# Patient Record
Sex: MALE | Employment: OTHER | URBAN - METROPOLITAN AREA
[De-identification: names, ages, dates, MRNs, and addresses within clinical notes are randomized per-mention and may not be internally consistent; named-entity substitution may affect disease eponyms.]

---

## 2018-08-20 ENCOUNTER — HOSPITAL ENCOUNTER (INPATIENT)
Facility: MEDICAL CENTER | Age: 83
LOS: 2 days | DRG: 244 | End: 2018-08-22
Admitting: INTERNAL MEDICINE
Payer: MEDICARE

## 2018-08-20 ENCOUNTER — HOSPITAL ENCOUNTER (OUTPATIENT)
Facility: MEDICAL CENTER | Age: 83
DRG: 244 | End: 2018-08-20
Payer: MEDICARE

## 2018-08-20 LAB
EKG IMPRESSION: NORMAL
TROPONIN I SERPL-MCNC: 0.04 NG/ML (ref 0–0.04)

## 2018-08-20 PROCEDURE — 84484 ASSAY OF TROPONIN QUANT: CPT

## 2018-08-20 PROCEDURE — 99221 1ST HOSP IP/OBS SF/LOW 40: CPT | Performed by: INTERNAL MEDICINE

## 2018-08-20 PROCEDURE — 93010 ELECTROCARDIOGRAM REPORT: CPT | Mod: 77 | Performed by: INTERNAL MEDICINE

## 2018-08-20 PROCEDURE — 700105 HCHG RX REV CODE 258: Performed by: INTERNAL MEDICINE

## 2018-08-20 PROCEDURE — 36415 COLL VENOUS BLD VENIPUNCTURE: CPT

## 2018-08-20 PROCEDURE — 770020 HCHG ROOM/CARE - TELE (206)

## 2018-08-20 PROCEDURE — 93005 ELECTROCARDIOGRAM TRACING: CPT | Performed by: INTERNAL MEDICINE

## 2018-08-20 PROCEDURE — 93010 ELECTROCARDIOGRAM REPORT: CPT | Performed by: INTERNAL MEDICINE

## 2018-08-20 RX ORDER — POLYETHYLENE GLYCOL 3350 17 G/17G
1 POWDER, FOR SOLUTION ORAL
Status: DISCONTINUED | OUTPATIENT
Start: 2018-08-20 | End: 2018-08-22 | Stop reason: HOSPADM

## 2018-08-20 RX ORDER — ATORVASTATIN CALCIUM 10 MG/1
10 TABLET, FILM COATED ORAL NIGHTLY
COMMUNITY

## 2018-08-20 RX ORDER — SODIUM CHLORIDE 9 MG/ML
INJECTION, SOLUTION INTRAVENOUS CONTINUOUS
Status: CANCELLED | OUTPATIENT
Start: 2018-08-20

## 2018-08-20 RX ORDER — AMOXICILLIN 250 MG
2 CAPSULE ORAL 2 TIMES DAILY
Status: DISCONTINUED | OUTPATIENT
Start: 2018-08-20 | End: 2018-08-22 | Stop reason: HOSPADM

## 2018-08-20 RX ORDER — ACETAMINOPHEN 325 MG/1
650 TABLET ORAL EVERY 6 HOURS PRN
Status: DISCONTINUED | OUTPATIENT
Start: 2018-08-20 | End: 2018-08-22 | Stop reason: HOSPADM

## 2018-08-20 RX ORDER — BISACODYL 10 MG
10 SUPPOSITORY, RECTAL RECTAL
Status: DISCONTINUED | OUTPATIENT
Start: 2018-08-20 | End: 2018-08-22 | Stop reason: HOSPADM

## 2018-08-20 RX ORDER — SODIUM CHLORIDE 9 MG/ML
INJECTION, SOLUTION INTRAVENOUS CONTINUOUS
Status: DISCONTINUED | OUTPATIENT
Start: 2018-08-20 | End: 2018-08-21

## 2018-08-20 RX ADMIN — SODIUM CHLORIDE: 9 INJECTION, SOLUTION INTRAVENOUS at 18:51

## 2018-08-20 ASSESSMENT — LIFESTYLE VARIABLES
HAVE YOU EVER FELT YOU SHOULD CUT DOWN ON YOUR DRINKING: NO
TOTAL SCORE: 0
EVER HAD A DRINK FIRST THING IN THE MORNING TO STEADY YOUR NERVES TO GET RID OF A HANGOVER: NO
HOW MANY TIMES IN THE PAST YEAR HAVE YOU HAD 5 OR MORE DRINKS IN A DAY: 0
EVER HAD A DRINK FIRST THING IN THE MORNING TO STEADY YOUR NERVES TO GET RID OF A HANGOVER: NO
ON A TYPICAL DAY WHEN YOU DRINK ALCOHOL HOW MANY DRINKS DO YOU HAVE: 1
ALCOHOL_USE: YES
TOTAL SCORE: 0
TOTAL SCORE: 0
HAVE PEOPLE ANNOYED YOU BY CRITICIZING YOUR DRINKING: NO
TOTAL SCORE: 0
AVERAGE NUMBER OF DAYS PER WEEK YOU HAVE A DRINK CONTAINING ALCOHOL: 2
HAVE YOU EVER FELT YOU SHOULD CUT DOWN ON YOUR DRINKING: NO
ALCOHOL_USE: YES
CONSUMPTION TOTAL: NEGATIVE
HAVE PEOPLE ANNOYED YOU BY CRITICIZING YOUR DRINKING: NO
EVER FELT BAD OR GUILTY ABOUT YOUR DRINKING: NO
CONSUMPTION TOTAL: INCOMPLETE
TOTAL SCORE: 0
EVER FELT BAD OR GUILTY ABOUT YOUR DRINKING: NO
TOTAL SCORE: 0
EVER_SMOKED: NEVER

## 2018-08-20 ASSESSMENT — COPD QUESTIONNAIRES
COPD SCREENING SCORE: 0
DO YOU EVER COUGH UP ANY MUCUS OR PHLEGM?: NO/ONLY WITH OCCASIONAL COLDS OR INFECTIONS
HAVE YOU SMOKED AT LEAST 100 CIGARETTES IN YOUR ENTIRE LIFE: NO/DON'T KNOW
IN THE PAST 12 MONTHS DO YOU DO LESS THAN YOU USED TO BECAUSE OF YOUR BREATHING PROBLEMS: DISAGREE/UNSURE
DURING THE PAST 4 WEEKS HOW MUCH DID YOU FEEL SHORT OF BREATH: NONE/LITTLE OF THE TIME

## 2018-08-20 ASSESSMENT — PATIENT HEALTH QUESTIONNAIRE - PHQ9
SUM OF ALL RESPONSES TO PHQ9 QUESTIONS 1 AND 2: 0
1. LITTLE INTEREST OR PLEASURE IN DOING THINGS: NOT AT ALL
2. FEELING DOWN, DEPRESSED, IRRITABLE, OR HOPELESS: NOT AT ALL

## 2018-08-20 ASSESSMENT — COGNITIVE AND FUNCTIONAL STATUS - GENERAL
EATING MEALS: A LITTLE
MOVING FROM LYING ON BACK TO SITTING ON SIDE OF FLAT BED: A LITTLE
MOBILITY SCORE: 19
WALKING IN HOSPITAL ROOM: A LITTLE
TURNING FROM BACK TO SIDE WHILE IN FLAT BAD: A LITTLE
DRESSING REGULAR LOWER BODY CLOTHING: A LITTLE
STANDING UP FROM CHAIR USING ARMS: A LITTLE
DAILY ACTIVITIY SCORE: 18
TOILETING: A LITTLE
PERSONAL GROOMING: A LITTLE
STANDING UP FROM CHAIR USING ARMS: A LITTLE
MOVING FROM LYING ON BACK TO SITTING ON SIDE OF FLAT BED: A LITTLE
DRESSING REGULAR UPPER BODY CLOTHING: A LITTLE
SUGGESTED CMS G CODE MODIFIER MOBILITY: CK
MOVING TO AND FROM BED TO CHAIR: A LITTLE
SUGGESTED CMS G CODE MODIFIER DAILY ACTIVITY: CK
HELP NEEDED FOR BATHING: A LITTLE
EATING MEALS: A LITTLE
WALKING IN HOSPITAL ROOM: A LITTLE
TOILETING: A LITTLE
MOBILITY SCORE: 19
MOVING TO AND FROM BED TO CHAIR: A LITTLE
TURNING FROM BACK TO SIDE WHILE IN FLAT BAD: A LITTLE
HELP NEEDED FOR BATHING: A LITTLE
SUGGESTED CMS G CODE MODIFIER MOBILITY: CK

## 2018-08-20 ASSESSMENT — PAIN SCALES - GENERAL
PAINLEVEL_OUTOF10: 0
PAINLEVEL_OUTOF10: 0

## 2018-08-20 NOTE — PROGRESS NOTES
Direct admit from [facility]. Accepted by Dr. Greene for bradycardia. Dr. Konstantin Ramos will consuslt  ADT signed & held, needs to be released upon pt arrival.  No written orders received.  Pt coming by ground.

## 2018-08-21 ENCOUNTER — APPOINTMENT (OUTPATIENT)
Dept: RADIOLOGY | Facility: MEDICAL CENTER | Age: 83
DRG: 244 | End: 2018-08-21
Attending: INTERNAL MEDICINE
Payer: MEDICARE

## 2018-08-21 PROBLEM — I48.91 ATRIAL FIBRILLATION (HCC): Status: ACTIVE | Noted: 2018-08-21

## 2018-08-21 PROBLEM — R42 DIZZINESS: Status: ACTIVE | Noted: 2018-08-21

## 2018-08-21 PROBLEM — R00.1 SYMPTOMATIC BRADYCARDIA: Status: ACTIVE | Noted: 2018-08-21

## 2018-08-21 PROBLEM — R42 DIZZINESS: Status: RESOLVED | Noted: 2018-08-21 | Resolved: 2018-08-21

## 2018-08-21 PROBLEM — E78.5 DYSLIPIDEMIA: Status: ACTIVE | Noted: 2018-08-21

## 2018-08-21 LAB
ALBUMIN SERPL BCP-MCNC: 3.5 G/DL (ref 3.2–4.9)
ALBUMIN/GLOB SERPL: 1.5 G/DL
ALP SERPL-CCNC: 81 U/L (ref 30–99)
ALT SERPL-CCNC: 6 U/L (ref 2–50)
ANION GAP SERPL CALC-SCNC: 6 MMOL/L (ref 0–11.9)
AST SERPL-CCNC: 19 U/L (ref 12–45)
BASOPHILS # BLD AUTO: 0.8 % (ref 0–1.8)
BASOPHILS # BLD: 0.05 K/UL (ref 0–0.12)
BILIRUB SERPL-MCNC: 1 MG/DL (ref 0.1–1.5)
BUN SERPL-MCNC: 17 MG/DL (ref 8–22)
CALCIUM SERPL-MCNC: 8.6 MG/DL (ref 8.5–10.5)
CHLORIDE SERPL-SCNC: 106 MMOL/L (ref 96–112)
CO2 SERPL-SCNC: 30 MMOL/L (ref 20–33)
CREAT SERPL-MCNC: 0.82 MG/DL (ref 0.5–1.4)
EKG IMPRESSION: NORMAL
EKG IMPRESSION: NORMAL
EOSINOPHIL # BLD AUTO: 0.29 K/UL (ref 0–0.51)
EOSINOPHIL NFR BLD: 4.5 % (ref 0–6.9)
ERYTHROCYTE [DISTWIDTH] IN BLOOD BY AUTOMATED COUNT: 50.3 FL (ref 35.9–50)
GLOBULIN SER CALC-MCNC: 2.3 G/DL (ref 1.9–3.5)
GLUCOSE SERPL-MCNC: 94 MG/DL (ref 65–99)
HCT VFR BLD AUTO: 43.3 % (ref 42–52)
HGB BLD-MCNC: 13.8 G/DL (ref 14–18)
IMM GRANULOCYTES # BLD AUTO: 0.01 K/UL (ref 0–0.11)
IMM GRANULOCYTES NFR BLD AUTO: 0.2 % (ref 0–0.9)
INR PPP: 1.25 (ref 0.87–1.13)
LYMPHOCYTES # BLD AUTO: 1.62 K/UL (ref 1–4.8)
LYMPHOCYTES NFR BLD: 25.4 % (ref 22–41)
MCH RBC QN AUTO: 31.1 PG (ref 27–33)
MCHC RBC AUTO-ENTMCNC: 31.9 G/DL (ref 33.7–35.3)
MCV RBC AUTO: 97.5 FL (ref 81.4–97.8)
MONOCYTES # BLD AUTO: 0.56 K/UL (ref 0–0.85)
MONOCYTES NFR BLD AUTO: 8.8 % (ref 0–13.4)
NEUTROPHILS # BLD AUTO: 3.86 K/UL (ref 1.82–7.42)
NEUTROPHILS NFR BLD: 60.3 % (ref 44–72)
NRBC # BLD AUTO: 0 K/UL
NRBC BLD-RTO: 0 /100 WBC
PLATELET # BLD AUTO: 159 K/UL (ref 164–446)
PMV BLD AUTO: 10.4 FL (ref 9–12.9)
POTASSIUM SERPL-SCNC: 3.4 MMOL/L (ref 3.6–5.5)
PROT SERPL-MCNC: 5.8 G/DL (ref 6–8.2)
PROTHROMBIN TIME: 15.4 SEC (ref 12–14.6)
RBC # BLD AUTO: 4.44 M/UL (ref 4.7–6.1)
SODIUM SERPL-SCNC: 142 MMOL/L (ref 135–145)
T4 SERPL-MCNC: 8.4 UG/DL (ref 4–12)
TROPONIN I SERPL-MCNC: 0.04 NG/ML (ref 0–0.04)
TSH SERPL DL<=0.005 MIU/L-ACNC: 1.07 UIU/ML (ref 0.38–5.33)
WBC # BLD AUTO: 6.4 K/UL (ref 4.8–10.8)

## 2018-08-21 PROCEDURE — 304952 HCHG R 2 PADS

## 2018-08-21 PROCEDURE — 36415 COLL VENOUS BLD VENIPUNCTURE: CPT

## 2018-08-21 PROCEDURE — 84443 ASSAY THYROID STIM HORMONE: CPT

## 2018-08-21 PROCEDURE — C1786 PMKR, SINGLE, RATE-RESP: HCPCS

## 2018-08-21 PROCEDURE — 305387 HCHG SUTURES

## 2018-08-21 PROCEDURE — 700111 HCHG RX REV CODE 636 W/ 250 OVERRIDE (IP): Performed by: FAMILY MEDICINE

## 2018-08-21 PROCEDURE — 84484 ASSAY OF TROPONIN QUANT: CPT

## 2018-08-21 PROCEDURE — 80053 COMPREHEN METABOLIC PANEL: CPT

## 2018-08-21 PROCEDURE — 33207 INSERT HEART PM VENTRICULAR: CPT

## 2018-08-21 PROCEDURE — 99231 SBSQ HOSP IP/OBS SF/LOW 25: CPT | Performed by: FAMILY MEDICINE

## 2018-08-21 PROCEDURE — C1898 LEAD, PMKR, OTHER THAN TRANS: HCPCS

## 2018-08-21 PROCEDURE — 94760 N-INVAS EAR/PLS OXIMETRY 1: CPT

## 2018-08-21 PROCEDURE — C1892 INTRO/SHEATH,FIXED,PEEL-AWAY: HCPCS

## 2018-08-21 PROCEDURE — 99152 MOD SED SAME PHYS/QHP 5/>YRS: CPT

## 2018-08-21 PROCEDURE — 700111 HCHG RX REV CODE 636 W/ 250 OVERRIDE (IP)

## 2018-08-21 PROCEDURE — 84436 ASSAY OF TOTAL THYROXINE: CPT

## 2018-08-21 PROCEDURE — 93010 ELECTROCARDIOGRAM REPORT: CPT | Performed by: INTERNAL MEDICINE

## 2018-08-21 PROCEDURE — 02HK3JZ INSERTION OF PACEMAKER LEAD INTO RIGHT VENTRICLE, PERCUTANEOUS APPROACH: ICD-10-PCS | Performed by: INTERNAL MEDICINE

## 2018-08-21 PROCEDURE — 304853 HCHG PPM TEST CABLE

## 2018-08-21 PROCEDURE — 700101 HCHG RX REV CODE 250

## 2018-08-21 PROCEDURE — 71045 X-RAY EXAM CHEST 1 VIEW: CPT

## 2018-08-21 PROCEDURE — 770020 HCHG ROOM/CARE - TELE (206)

## 2018-08-21 PROCEDURE — 0JH605Z INSERTION OF PACEMAKER, SINGLE CHAMBER RATE RESPONSIVE INTO CHEST SUBCUTANEOUS TISSUE AND FASCIA, OPEN APPROACH: ICD-10-PCS | Performed by: INTERNAL MEDICINE

## 2018-08-21 PROCEDURE — 93005 ELECTROCARDIOGRAM TRACING: CPT | Performed by: INTERNAL MEDICINE

## 2018-08-21 PROCEDURE — 85610 PROTHROMBIN TIME: CPT

## 2018-08-21 PROCEDURE — 85025 COMPLETE CBC W/AUTO DIFF WBC: CPT

## 2018-08-21 RX ORDER — LIDOCAINE HYDROCHLORIDE 10 MG/ML
INJECTION, SOLUTION INFILTRATION; PERINEURAL
Status: COMPLETED
Start: 2018-08-21 | End: 2018-08-21

## 2018-08-21 RX ORDER — MIDAZOLAM HYDROCHLORIDE 1 MG/ML
INJECTION INTRAMUSCULAR; INTRAVENOUS
Status: COMPLETED
Start: 2018-08-21 | End: 2018-08-21

## 2018-08-21 RX ORDER — OXYCODONE HYDROCHLORIDE AND ACETAMINOPHEN 5; 325 MG/1; MG/1
1-2 TABLET ORAL EVERY 4 HOURS PRN
Status: DISCONTINUED | OUTPATIENT
Start: 2018-08-21 | End: 2018-08-22 | Stop reason: HOSPADM

## 2018-08-21 RX ORDER — POTASSIUM CHLORIDE 7.45 MG/ML
10 INJECTION INTRAVENOUS
Status: COMPLETED | OUTPATIENT
Start: 2018-08-21 | End: 2018-08-21

## 2018-08-21 RX ADMIN — POTASSIUM CHLORIDE 10 MEQ: 10 INJECTION, SOLUTION INTRAVENOUS at 15:42

## 2018-08-21 RX ADMIN — POTASSIUM CHLORIDE 10 MEQ: 10 INJECTION, SOLUTION INTRAVENOUS at 14:32

## 2018-08-21 RX ADMIN — LIDOCAINE HYDROCHLORIDE 20 MG: 20 INJECTION, SOLUTION INTRAVENOUS at 11:00

## 2018-08-21 RX ADMIN — MIDAZOLAM HYDROCHLORIDE 2 MG: 1 INJECTION, SOLUTION INTRAMUSCULAR; INTRAVENOUS at 12:13

## 2018-08-21 RX ADMIN — FENTANYL CITRATE 100 MCG: 50 INJECTION INTRAMUSCULAR; INTRAVENOUS at 12:14

## 2018-08-21 RX ADMIN — LIDOCAINE HYDROCHLORIDE: 10 INJECTION, SOLUTION INFILTRATION; PERINEURAL at 11:40

## 2018-08-21 RX ADMIN — VANCOMYCIN HYDROCHLORIDE 2000 MG: 1 INJECTION, POWDER, LYOPHILIZED, FOR SOLUTION INTRAVENOUS at 11:40

## 2018-08-21 ASSESSMENT — ENCOUNTER SYMPTOMS
BACK PAIN: 0
HEADACHES: 0
BLOOD IN STOOL: 0
ABDOMINAL PAIN: 0
DIZZINESS: 1
SORE THROAT: 0
PALPITATIONS: 0
SENSORY CHANGE: 0
COUGH: 0
MYALGIAS: 0
FOCAL WEAKNESS: 0
NECK PAIN: 0
NAUSEA: 0
TREMORS: 0
BLURRED VISION: 0
CHILLS: 0
SEIZURES: 0
FEVER: 0
BRUISES/BLEEDS EASILY: 0
SPUTUM PRODUCTION: 0
SHORTNESS OF BREATH: 0
VOMITING: 0
WHEEZING: 0
LOSS OF CONSCIOUSNESS: 0
TINGLING: 0
DIARRHEA: 0
DIAPHORESIS: 0
FLANK PAIN: 0

## 2018-08-21 ASSESSMENT — PAIN SCALES - GENERAL
PAINLEVEL_OUTOF10: 0
PAINLEVEL_OUTOF10: 0

## 2018-08-21 NOTE — PROGRESS NOTES
.Monitor summary    /0.10/      Rhythm:   A Fib/Flutter 56-87    Ectopy R bigeminy, couplet F PVC    Alt BBB with 2.08-2.3 sp

## 2018-08-21 NOTE — ASSESSMENT & PLAN NOTE
Likely secondary to bradycardia  Continues cardiac monitoring  Avoid AV ashley blocking agents  Cardiology has been consulted  Patient will likely need pacemaker placement  Persistent dizziness despite pacemaker placement consider evaluating for neurologic causes and stroke workup

## 2018-08-21 NOTE — CARE PLAN
Problem: Safety  Goal: Will remain free from injury  Outcome: PROGRESSING AS EXPECTED  .Patient and their room was assessed for risk factors that contribute to falls.  Patient was educated on using the call light and the light was kept within the patient's reach. Room was kept free of clutter and the patient was assisted to bathroom.  Patient was educated on why bed alarms are on. Bed in lowest position and upper side rails up.           Problem: Knowledge Deficit  Goal: Knowledge of disease process/condition, treatment plan, diagnostic tests, and medications will improve  Outcome: PROGRESSING AS EXPECTED  .Patient educated about plan of care, safety measures, and diagnosis. Questions were answered and patient verbalized an understanding of the material covered.

## 2018-08-21 NOTE — ASSESSMENT & PLAN NOTE
Status post pacemaker.  If cardiology agrees with discharge tomorrow patient will be discharged home.

## 2018-08-21 NOTE — CONSULTS
DATE OF SERVICE:  2018    CHIEF COMPLAINT:  Dizziness and bradycardia.    HISTORY OF PRESENT ILLNESS:  The patient is a delightful 86-year-old   gentleman, transferred from [facility] because of the above   problems.  Early  morning about 1:00 in the morning, he awakened his   wife.  The patient denied any chest pain or difficulty breathing, but he felt   quite sweaty.  He tapped on his wife's arm and awakened ____.  She states he   looked very pale and sweaty.  He had some slurring of his speech.  He was   taken to [facility] where he was admitted and observed.  The   patient has permanent atrial fibrillation and had episodes of significant   bradycardia today and therefore transferred to [facility]   for permanent pacer.    Unfortunately, the records that were sent on transfer are scant.  There are   some rhythm strips available that demonstrate atrial fibrillation with slow   ventricular response.  There is a brief period of heart rates below 30 beats   per minute.  Patient apparently asymptomatic for this.    There is no history of known coronary artery disease or chest pain, no sense   of palpitations.  Cardiac risk factor profile positive for hyperlipidemia,   negative for diabetes, smoking, hypertension, or family history of premature   coronary artery disease.    MEDICATIONS ON ADMISSION:  Atorvastatin 10 mg a day, cholecalciferol 2000   units daily, apixaban 5 mg twice daily, and multivitamins.    SURGERIES:  Cataract surgery, bilateral foot surgery.    FAMILY HISTORY:  Mother  of tuberculosis at a young age.  The patient was   16 years old at that time.  Father  of cancer in his mid 80s.  There is no   family history of premature coronary artery disease.    REVIEW OF SYSTEMS:  NEUROLOGIC:  No history of stroke or TIA, no history of sensory change.  He   does have history of slurred speech on the night of admission to [facility].  EYES:  No recent visual changes.  Had cataract surgeries.  ENT:  History of congenital deafness.  Patient is a lip reader.  PULMONARY:  Denies asthma or shortness of breath.  No history of COPD.  CARDIAC:  As above.  GASTROINTESTINAL:  Denies any melena or abdominal pain.  RENAL:  No history of kidney stones or hematuria.  MUSCULOSKELETAL:  No recent trauma.  ____ the patient's son locked up the   ladders at the house, so he would not climb any.  All others are reviewed and   are negative.    PHYSICAL EXAMINATION:  GENERAL:  The patient is a very pleasant gentleman in no distress.  He is   virtually deaf.  VITAL SIGNS:  On the monitor, he is in atrial fibrillation.  Current heart   rate is 61, blood pressure 153/92.  Afebrile.  Weight is 79 kilos.  HEENT:  Pupils are equal, gaze conjugate, sclerae nonicteric.  NECK:  There is no JVD.  Carotid upstroke intact and without bruit.  LUNGS:  Clear.  BACK:  Without deformity.  HEART:  Irregularly irregular rhythm.  There is no murmur, no heave.  ABDOMEN:  Soft and nontender.  There are no masses, no pain to palpation.  No   bruit.  EXTREMITIES:  No edema.  Posterior tibial pulses are 2+.  Musculature is   normal.  SKIN:  Warm and dry.    IMAGING:  EKG from [facility] demonstrates atrial fibrillation with   slow ventricular response rate.  No acute ST segment changes are present.    IMPRESSION AND PLAN:  1.  Atrial fibrillation.  Patient has permanent atrial fibrillation.  He had   slow ventricular response rates today with heart rates dipping into the 27   range.  He is currently scheduled for permanent pacemaker tomorrow.  We will   endeavor to obtain more records from [facility] including more   rhythm strips and will monitor his rhythm here.  2.  History of hyperlipidemia, treated with atorvastatin.  3.  History of congenital deafness.  4.  Episode of slurred speech.  The patient had an episode early in the   morning hours of admission  to [facility] where wife noted slurred   speech and dilated pupils.  Etiology of this is uncertain.  May have been   secondary to a bradycardic episode.  He had no sustained speech abnormalities.       ____________________________________     MD NUNO TERESA / ROD    DD:  08/20/2018 18:25:49  DT:  08/20/2018 21:17:21    D#:  [id number]  Job#:  [id number]    cc: Marcial Navarrete

## 2018-08-21 NOTE — PROGRESS NOTES
HPI:  HISTORY OF PRESENT ILLNESS:  The patient is a delightful 86-year-old   gentleman, transferred from [facility] because of the above   problems.  Early Sunday morning about 1:00 in the morning, he awakened his   wife.  The patient denied any chest pain or difficulty breathing, but he felt   quite sweaty.  He tapped on his wife's arm and awakened her.  She states he   looked very pale and sweaty.  He had some slurring of his speech.  He was   taken to [facility] where he was admitted and observed.  The   patient has permanent atrial fibrillation and had episodes of significant   bradycardia today and therefore transferred to [facility]   for permanent pacer.  NPO this AM   Procedure reviewed with patient and family.  Questions answered.  RIsks and benefits reviewed they verbalized understanding.

## 2018-08-21 NOTE — PROGRESS NOTES
Pt arrived on the floor, family at bedside. Oriented pt an family to the unit.  and  notified of pt's arrival. Awaiting orders. Pt remains NPO pending orders. POC discussed, with pt and family, all questions and concerns were addressed. Pt denies pain. Telemetry monitor placed. Pt reports all needs to be met at this time. Pt resting appears calm and comfortable at this time

## 2018-08-21 NOTE — OP REPORT
Electrophysiology Procedure Note  [facility]    PROCEDURE PERFORMED: Single-chamber ventricular-based pacemaker, moderate sedation administered by RN and supervised by physician    : Camilo Guevara MD    ASSISTANT: none    ANESTHESIA: Moderate sedation,  start time 12:02 PM, stop time 12:21 PM  the moderate sedation document has been reviewed, signed and scanned into media     EBL: 30 cc    SPECIMENS: None    INDICATION: Atrial fibrillation with slow ventricular response    PRE PROCEDURE ECG: Atrial fibrillation    POST PROCEDURE ECG: Atrial fibrillation with ventricular pacing     COMPLICATIONS: None    DESCRIPTION OF PROCEDURE:  After informed written consent, the patient was brought to the electrophysiology lab in the fasting, unsedated state. The patient was prepped and draped in the usual sterile fashion. The procedure was performed under moderate sedation with local anesthetic.   A left infraclavicular incision was made with a scalpel and the pectoral device pocket was created using a combination of blunt dissection and electrocautery. The modified Seldinger technique was used to gain access to the left axillary vein. A peel-away hemostasis sheath was placed in the vein. Under fluoroscopic guidance, the pacemaker leads were introduced into the heart. The ventricular lead was advanced to the RVOT and then lowered into position at the RV apex.  The leads were tested and had satisfactory sensing and pacing parameters. High output ventricular pacing did not produce extracardiac stimulation. The lead was sutured to the underlying pectoral muscle with interrupted non absorbable suture over a silastic suture sleeve. The device pocket was irrigated with antibiotic solution, inspected, and no bleeding was seen. The lead was connected to the single-chamber ventricular based pulse generator and the device was inserted into the pocket The wound was closed with three layers of absorbable  sutures.  I personally supervised the administration of moderate sedation by the RN and observed the level of consciousness and physiologic status throughout the procedure.   Following recovery from sedation, the patient was transferred to a monitored bed in good condition.     IMPLANTED DEVICE INFORMATION:  Pulse generator is a [employer] model L310  Serial #[id number]    LEAD INFORMATION:  1)Right ventricular lead is a [employer] model #7742, serial #[id number], R wave 16 millivolts, threshold 0.4 volts, pacing impedance 1140 ohms.      DEVICE PROGRAMMING:  VVIR    FLUOROSCOPY TIME: 0.7 min    IMPRESSIONS:  1. Successful single-chamber ventricular-based pacemaker implantation    RECOMMENDATIONS:  1. Transfer to monitored bed  2. PA and lateral chest x-ray  3. Device interrogation prior to hospital discharge  4. Followup in device clinic

## 2018-08-21 NOTE — H&P
Hospital Medicine History & Physical Note    Date of Service  8/20/2018    Primary Care Physician  No primary care provider on file.    Consultants  Cardiology     Code Status  Full code    Chief Complaint  Dizziness    History of Presenting Illness  86 y.o. male with a past medical history of atrial fibrillation on Eliquis who was transferred from [facility] on 8/20/2018 with concerns of dizziness and bradycardia.  Patient states that he has had intermittent dizziness for the past 1 month.  Yesterday morning the patient states that he got up from a sitting position when he suddenly felt lightheaded and fell down.  He reports multiple episodes of presyncope this past week.  He denies any headache, focal muscle weakness, vision changes, numbness, tingling, vision changes, chest pain, fever or shortness of breath.  He denies taking any rate controlling medications.  Patient states he took his last dose of Eliquis on Sunday.  He presented to [facility] where he was found to have bradycardia and was transferred to [facility] for cardiology evaluation.  Transferring medical records revealed a 2D echo that was done on 8/19/18 that reveals left ventricular ejection fraction 60%, grade 1 diastolic dysfunction, moderate mitral regurgitation, moderate tricuspid regurgitation.  Normal right ventricular size and function.    EKG interpreted by me reveals atrial fibrillation, heart rate 60 with premature ventricular complexes and evidence of LVH.  No ST elevation or ST depression noted.    Review of Systems  Review of Systems   Constitutional: Positive for malaise/fatigue. Negative for chills, diaphoresis and fever.   HENT: Negative for hearing loss and sore throat.    Eyes: Negative for blurred vision.   Respiratory: Negative for cough, sputum production, shortness of breath and wheezing.    Cardiovascular: Negative for chest pain, palpitations and leg swelling.   Gastrointestinal: Negative for  abdominal pain, blood in stool, diarrhea, nausea and vomiting.   Genitourinary: Negative for dysuria, flank pain and urgency.   Musculoskeletal: Negative for back pain, joint pain, myalgias and neck pain.   Skin: Negative for rash.   Neurological: Positive for dizziness. Negative for tingling, tremors, sensory change, focal weakness, seizures, loss of consciousness and headaches.   Endo/Heme/Allergies: Does not bruise/bleed easily.   Psychiatric/Behavioral: Negative for suicidal ideas.   All other systems reviewed and are negative.      Past Medical History   has no past medical history of Anginal syndrome ([facility]); Arrhythmia; Arthritis; Asthma; At risk for sleep apnea; Back pain; Blood clotting disorder ([facility]); Bronchitis; Cancer ([facility]); Cataract; Congestive heart failure ([facility]); COPD (chronic obstructive pulmonary disease) ([facility]); Diabetes ([facility]); Dialysis patient ([facility]); Disorder of thyroid; Fall; Gynecological disorder; Heart murmur; Heart valve disease; Hemorrhagic disorder ([facility]); Hypertension; Indigestion; Infectious disease; Jaundice; Myocardial infarct ([facility]); Pacemaker; Pneumonia; Psychiatric problem; Renal disorder; Rheumatic fever; Seizure ([facility]); Stroke ([facility]); or Urinary incontinence.    Surgical History  Tonsillectomy    Family History  No pertinent surgical history    Social History   reports that he has never smoked. He has never used smokeless tobacco.    Allergies  Allergies   Allergen Reactions   • Azithromycin Rash     Rash per wife       Medications  Prior to Admission Medications   Prescriptions Last Dose Informant Patient Reported? Taking?   apixaban (ELIQUIS) 5mg Tab 8/19/2018 at 1800  Yes Yes   Sig: Take 5 mg by mouth 2 Times a Day.   atorvastatin (LIPITOR) 10 MG Tab 8/19/2018 at Unknown time  Yes Yes   Sig: Take 10 mg by mouth every evening.      Facility-Administered Medications: None       Physical Exam  Blood Pressure : 153/92   Temperature: 37.2 °C (99 °F)   Pulse: 61   Respiration: 16   Pulse  Oximetry: 97 %     Physical Exam   Constitutional: He is oriented to person, place, and time. He appears well-developed and well-nourished. No distress.   HENT:   Head: Normocephalic and atraumatic.   Mouth/Throat: Oropharynx is clear and moist.   Eyes: Pupils are equal, round, and reactive to light. Conjunctivae are normal. No scleral icterus.   Neck: Normal range of motion. Neck supple.   Cardiovascular: Normal heart sounds.    Bradycardic and irregular   Pulmonary/Chest: Effort normal and breath sounds normal. No respiratory distress. He has no wheezes. He has no rales.   Abdominal: Soft. Bowel sounds are normal. He exhibits no distension. There is no tenderness. There is no rebound.   Musculoskeletal: Normal range of motion. He exhibits no edema or tenderness.   Lymphadenopathy:     He has no cervical adenopathy.   Neurological: He is alert and oriented to person, place, and time. No cranial nerve deficit. Coordination normal.   Strength and sensation intact bilaterally   Skin: Skin is warm. No rash noted.   Psychiatric: He has a normal mood and affect. His behavior is normal.   Nursing note and vitals reviewed.      Laboratory:          No results for input(s): ALTSGPT, ASTSGOT, ALKPHOSPHAT, TBILIRUBIN, DBILIRUBIN, GAMMAGT, AMYLASE, LIPASE, ALB, PREALBUMIN, GLUCOSE in the last 72 hours.              Lab Results   Component Value Date    TROPONINI 0.04 08/20/2018       Urinalysis:    No results found     Imaging:  No orders to display         Assessment/Plan:  I anticipate this patient will require at least two midnights for appropriate medical management, necessitating inpatient admission.    Dizziness- (present on admission)   Assessment & Plan    Likely secondary to bradycardia  Continues cardiac monitoring  Avoid AV ashley blocking agents  Cardiology has been consulted  Patient will likely need pacemaker placement  If persistent dizziness despite pacemaker placement consider evaluating for neurologic causes  and stroke workup        Symptomatic bradycardia- (present on admission)   Assessment & Plan    Continuous cardiac monitoring  Avoid AV ashley blocking agents  If patient becomes hypotensive overnight will consider starting on IV dopamine and transfer to ICU  Plan for pacemaker placement in the morning  N.p.o.        Dyslipidemia- (present on admission)   Assessment & Plan    Continue atorvastatin        Atrial fibrillation (HCC)- (present on admission)   Assessment & Plan    Patient is not on any rate control medications  Hold Eliquis for pacemaker placement            VTE prophylaxis: SCD

## 2018-08-21 NOTE — PROGRESS NOTES
.Patient care assumed for night shift call light within reach, safety measures implemented, and plan of care discussed. All patient questions answered. VSS and pain assessed. Orders in as Pt was a direct admit at 1700. Plan for pacemaker in morning. NPO midnight

## 2018-08-22 ENCOUNTER — APPOINTMENT (OUTPATIENT)
Dept: RADIOLOGY | Facility: MEDICAL CENTER | Age: 83
DRG: 244 | End: 2018-08-22
Attending: INTERNAL MEDICINE
Payer: MEDICARE

## 2018-08-22 ENCOUNTER — PATIENT OUTREACH (OUTPATIENT)
Dept: HEALTH INFORMATION MANAGEMENT | Facility: OTHER | Age: 83
End: 2018-08-22

## 2018-08-22 VITALS
HEART RATE: 108 BPM | BODY MASS INDEX: 25 KG/M2 | HEIGHT: 70 IN | WEIGHT: 174.6 LBS | RESPIRATION RATE: 16 BRPM | SYSTOLIC BLOOD PRESSURE: 130 MMHG | TEMPERATURE: 97.4 F | DIASTOLIC BLOOD PRESSURE: 76 MMHG | OXYGEN SATURATION: 91 %

## 2018-08-22 PROBLEM — Z95.0 STATUS POST PLACEMENT OF CARDIAC PACEMAKER: Chronic | Status: ACTIVE | Noted: 2018-08-22

## 2018-08-22 LAB — EKG IMPRESSION: NORMAL

## 2018-08-22 PROCEDURE — 93005 ELECTROCARDIOGRAM TRACING: CPT | Performed by: INTERNAL MEDICINE

## 2018-08-22 PROCEDURE — 93010 ELECTROCARDIOGRAM REPORT: CPT | Performed by: INTERNAL MEDICINE

## 2018-08-22 PROCEDURE — 99239 HOSP IP/OBS DSCHRG MGMT >30: CPT | Performed by: FAMILY MEDICINE

## 2018-08-22 PROCEDURE — 71045 X-RAY EXAM CHEST 1 VIEW: CPT

## 2018-08-22 PROCEDURE — 4B02XSZ MEASUREMENT OF CARDIAC PACEMAKER, EXTERNAL APPROACH: ICD-10-PCS | Performed by: INTERNAL MEDICINE

## 2018-08-22 ASSESSMENT — ENCOUNTER SYMPTOMS
BLURRED VISION: 0
HEADACHES: 0
PSYCHIATRIC NEGATIVE: 1
HEARTBURN: 0
MUSCULOSKELETAL NEGATIVE: 1
LOSS OF CONSCIOUSNESS: 0
ORTHOPNEA: 0
PND: 0
VOMITING: 0
CLAUDICATION: 0
TREMORS: 0
WHEEZING: 0
SENSORY CHANGE: 0
SPUTUM PRODUCTION: 0
HEMOPTYSIS: 0
FOCAL WEAKNESS: 0
STRIDOR: 0
DIARRHEA: 0
FEVER: 0
DIZZINESS: 0
NAUSEA: 0
SHORTNESS OF BREATH: 0
PALPITATIONS: 0
SPEECH CHANGE: 0
COUGH: 0
SORE THROAT: 0
CHILLS: 0
DOUBLE VISION: 0
TINGLING: 0
WEIGHT LOSS: 0

## 2018-08-22 ASSESSMENT — PAIN SCALES - GENERAL: PAINLEVEL_OUTOF10: 0

## 2018-08-22 NOTE — PROGRESS NOTES
Monitor Summary:   0/0/0     Rhythm: Paced underlying A. Fib 53-75 above 60's after pacer         Ectopy: Rare PVCs

## 2018-08-22 NOTE — PROGRESS NOTES
Cardiology Progress Note               Author: ANGELA Walker Date & Time created: 8/22/2018  9:41 AM     Interval History:  This delightful 85 yo gentleman was transferred from [facility] he awoke at 1 AM in the morning and had slurring of his speech felt quite sweaty.  The patient has a history of permanent atrial fibrillation.    On transfer rhythm strips showed a slow heart rate of 27 at times.  Patient had PPM placed 8/21/2018    Chief Complaint:  Cardiology consultation for dizziness and bradycardia.    8/22/2018: Patient sitting in chair in no distress.  He is quite anxious to be discharged.  He has no dizziness, chest pain, shortness of breath    Telemetry: A. fib V paced occasional PVC, couplet, BBB  Labs: None today    Review of Systems   Constitutional: Negative for chills and fever.   Respiratory: Negative for cough, hemoptysis, sputum production, shortness of breath and wheezing.    Cardiovascular: Negative for chest pain, palpitations, orthopnea, claudication, leg swelling and PND.   Gastrointestinal: Negative for nausea and vomiting.   Neurological: Negative for dizziness and headaches.     Physical Exam   Constitutional: He is oriented to person, place, and time. He appears well-developed and well-nourished.   HENT:   Head: Normocephalic and atraumatic.   Eyes: EOM are normal.   Neck: Neck supple.   Cardiovascular: Normal rate, regular rhythm, normal heart sounds and intact distal pulses.    No murmur heard.  Pulmonary/Chest: Effort normal and breath sounds normal.   Abdominal: Soft. Bowel sounds are normal.   Musculoskeletal: He exhibits no edema.   Neurological: He is alert and oriented to person, place, and time.   Skin: Skin is warm and dry.   Right anterior chest with pressure dressing. EP to see shortly and with replace dressing.   Psychiatric: He has a normal mood and affect. His behavior is normal. Judgment and thought content normal.   Nursing note and vitals  reviewed.    Hemodynamics:  Temp (24hrs), Av.4 °C (97.6 °F), Min:36 °C (96.8 °F), Max:36.8 °C (98.3 °F)  Temperature: 36.3 °C (97.4 °F)  Pulse  Av.8  Min: 50  Max: 108   Blood Pressure : 130/76     Respiratory:    Respiration: 16, Pulse Oximetry: 91 %  Fluids:     Weight: 79.2 kg (174 lb 9.7 oz)  GI/Nutrition:  Orders Placed This Encounter   Procedures   • Diet Order Cardiac     Standing Status:   Standing     Number of Occurrences:   1     Order Specific Question:   Diet:     Answer:   Cardiac [6]     Lab Results:  Recent Labs      18   0108   WBC  6.4   RBC  4.44*   HEMOGLOBIN  13.8*   HEMATOCRIT  43.3   MCV  97.5   MCH  31.1   MCHC  31.9*   RDW  50.3*   PLATELETCT  159*   MPV  10.4     Recent Labs      18   0108   SODIUM  142   POTASSIUM  3.4*   CHLORIDE  106   CO2  30   GLUCOSE  94   BUN  17   CREATININE  0.82   CALCIUM  8.6     Recent Labs      18   0108   INR  1.25*         Recent Labs      18   1842  18   0108   TROPONINI  0.04  0.04     Permanent Pacemaker 18  IMPLANTED DEVICE INFORMATION:  Pulse generator is a [place] model L310  Serial #[id number]  LEAD INFORMATION:  1)Right ventricular lead is a [place] model #7742, serial #[id number], R wave 16 millivolts, threshold 0.4 volts, pacing impedance 1140 ohms.  DEVICE PROGRAMMING:  VVIR  FLUOROSCOPY TIME: 0.7 min  IMPRESSIONS:  1. Successful single-chamber ventricular-based pacemaker implantation    Medical Decision Making, by Problem:  Active Hospital Problems    Diagnosis   • Symptomatic bradycardia [R00.1]   • Status post placement of cardiac pacemaker [Z95.0]   • Atrial fibrillation (HCC) [I48.91]     Plan:  1. Bradycardia  -Status post PPM  -Low heart rate 27    2.  Atrial fibrillation  -Eliquis 5 mg twice daily    Thank you for allowing us to participate in the care of your patient. Will sign off.       Quality-Core Measures   Reviewed items::  EKG reviewed, Labs reviewed, Medications reviewed and Radiology images  reviewed  Taylor catheter::  No Taylor  DVT prophylaxis pharmacological::  Not indicated at this time, ambulatory  DVT prophylaxis - mechanical:  Not indicated at this time, ambulatory

## 2018-08-22 NOTE — PROGRESS NOTES
Pt discharged to home PT states they understand discharge instructions with no further question. Tele monitor and PIV removed. All belongings with pt.   Patient and family instructed to follow up with their cardiologist after having this pacemaker placed and with their PCP about FARHEEN/sleep study.

## 2018-08-22 NOTE — CARE PLAN
Problem: Venous Thromboembolism (VTW)/Deep Vein Thrombosis (DVT) Prevention:  Goal: Patient will participate in Venous Thrombosis (VTE)/Deep Vein Thrombosis (DVT)Prevention Measures    Intervention: Encourage ambulation/mobilization at level directed by Physical Therapy in collaboration with Interdisciplinary Team  Encouraged patient to ambulate.  Family assisted patient to walk around unit one time with walker.      Problem: Bowel/Gastric:  Goal: Normal bowel function is maintained or improved    Intervention: Educate patient and significant other/support system about diet, fluid intake, medications and activity to promote bowel function  Educated patient to increase fluid intake and ambulation to assist with bowel movements.

## 2018-08-22 NOTE — PROGRESS NOTES
.Patient care assumed for night shift call light within reach, safety measures implemented, and plan of care discussed. All patient questions answered. VSS and pain assessed. s/p permanent pacemaker last q hr vitals. No signs of distress on RA

## 2018-08-22 NOTE — PROGRESS NOTES
Patient is seen and examined.    C/o pain and discomfort at site of insertion but feels better. Eating well.    Vitals are reviewed.     Gen - Alert , awake oriented x 3  Heart - S1,S2 is audible , sling with pacemaker placement  Lungs - Clear to auscultation, b/l no rhonchi, no wheezing   Extremities - no edema     Assessment :    1. Atrial fibrillation s/p slow ventricular response and syncope  2. Hyperlipidemia.      Plan:     Continue overnight monitoring, once ok with cardiology, discharge in AM after repeat Chest X ray review.   Patient will follow up with cardiologist in [place]. Continue Anticoagulation with Eliquis . Patient is s/p Pacemaker placement. Stop IV fluid. Continue to follow.

## 2018-08-22 NOTE — CARE PLAN
Problem: Infection  Goal: Will remain free from infection  Outcome: PROGRESSING AS EXPECTED  .Patient received verbal  instructions on infection prevention. Hand hygiene implemented for standard precautions against spread of infection.     Problem: Discharge Barriers/Planning  Goal: Patient's continuum of care needs will be met  Outcome: PROGRESSING AS EXPECTED  .Patient educated about plan of care, safety measures, and diagnosis. Questions were answered and patient verbalized an understanding of the material covered.

## 2018-08-22 NOTE — DISCHARGE SUMMARY
Discharge Summary    CHIEF COMPLAINT ON ADMISSION  Dizziness     Reason for Admission  bradycardia     Admission Date  8/20/2018    CODE STATUS  Full Code    HPI & HOSPITAL COURSE  86 y.o. male with a past medical history of atrial fibrillation on Eliquis who was transferred from [facility] on 8/20/2018 with concerns of dizziness and bradycardia.  Patient states that he has had intermittent dizziness for the past 1 month.  Yesterday morning the patient states that he got up from a sitting position when he suddenly felt lightheaded and fell down.  He reports multiple episodes of presyncope this past week.  He denies any headache, focal muscle weakness, vision changes, numbness, tingling, vision changes, chest pain, fever or shortness of breath.  He denies taking any rate controlling medications.  Patient states he took his last dose of Eliquis on Sunday.  He presented to [facility] where he was found to have bradycardia and was transferred to [facility] for cardiology evaluation.  Transferring medical records revealed a 2D echo that was done on 8/19/18 that reveals left ventricular ejection fraction 60%, grade 1 diastolic dysfunction, moderate mitral regurgitation, moderate tricuspid regurgitation.  Normal right ventricular size and function.  Patient had a brief period of heart rate below 30 bpm however patient was asymptomatic on the rhythm strips available from the outside record.  Lowest heart rate was 27/min.    Cardiology was consulted and decision with made to proceed with pacemaker placement.  Patient had a cardiac pacemaker placement done by Dr. Camilo Guevara on 21 August 2018.  Postprocedure there were no complication.  Before hospital discharge device interrogation was done as per my discussion with patient's nurse.  Patient is advised to have a follow-up outpatient with device clinic in 1-2 weeks.  Patient will be discharged home today anticoagulation can be resumed at home.             Therefore, he is discharged in good and stable condition to home with close outpatient follow-up.    The patient met 2-midnight criteria for an inpatient stay at the time of discharge.    Discharge Date  8/22/2018    FOLLOW UP ITEMS POST DISCHARGE  Device clinic follow-up in 1-2 weeks    DISCHARGE DIAGNOSES  Active Problems:    Symptomatic bradycardia POA: Yes    Atrial fibrillation (HCC) POA: Yes    Status post placement of cardiac pacemaker (Chronic) POA: Unknown  Resolved Problems:    Dizziness POA: Yes      FOLLOW UP  Patient is from [place] and he has a cardiologist named Dr. Romaine Navarrete .Patient already informed to Dr. Navarrete, who will see patient    MEDICATIONS ON DISCHARGE     Medication List      CONTINUE taking these medications      Instructions   atorvastatin 10 MG Tabs  Commonly known as:  LIPITOR   Take 10 mg by mouth every evening.  Dose:  10 mg     ELIQUIS 5mg Tabs  Generic drug:  apixaban   Take 5 mg by mouth 2 Times a Day.  Dose:  5 mg            Allergies  Allergies   Allergen Reactions   • Azithromycin Rash     Rash per wife       DIET  Orders Placed This Encounter   Procedures   • Diet Order Cardiac     Standing Status:   Standing     Number of Occurrences:   1     Order Specific Question:   Diet:     Answer:   Cardiac [6]       ACTIVITY  As tolerated.  Weight bearing as tolerated  Postprocedure pacemaker instruction as provided by Dr. Guevara    CONSULTATIONS  Cardiology, electrophysiology    PROCEDURES  Pacemaker placement    LABORATORY  Lab Results   Component Value Date    SODIUM 142 08/21/2018    POTASSIUM 3.4 (L) 08/21/2018    CHLORIDE 106 08/21/2018    CO2 30 08/21/2018    GLUCOSE 94 08/21/2018    BUN 17 08/21/2018    CREATININE 0.82 08/21/2018        Lab Results   Component Value Date    WBC 6.4 08/21/2018    HEMOGLOBIN 13.8 (L) 08/21/2018    HEMATOCRIT 43.3 08/21/2018    PLATELETCT 159 (L) 08/21/2018        Total time of the discharge process exceeds 40 minutes.

## 2018-08-22 NOTE — PROGRESS NOTES
Cardiology Progress Note               Author: Kayla Hayward Date & Time created: 8/22/2018  9:40 AM     Interval History:  Seen in EP rounds  S/P successful [employer] single chamber PPM implant by Dr. Guevara yesterday for AF with slow ventricular response.   Monitored rhythm: AF, rate 69.  No events last night.  Feeling well, no complaints.  Has ambulated in the halls.     CXR conclusions:  1. No significant interval change. No appreciable pneumothorax status post pacemaker placement.    BSI Device Interrogation:  RV: r waves 18.4 mV, Threshold: 0.6 V, Impedance: 874 ohms.     Chief Complaint:  Dizziness    Review of Systems   Constitutional: Negative for chills, fever, malaise/fatigue and weight loss.   HENT: Negative for congestion, sore throat and tinnitus.    Eyes: Negative for blurred vision and double vision.   Respiratory: Negative for cough, hemoptysis, sputum production, shortness of breath, wheezing and stridor.    Cardiovascular: Negative for chest pain, palpitations, orthopnea, leg swelling and PND.   Gastrointestinal: Negative for diarrhea, heartburn, nausea and vomiting.   Genitourinary: Negative.    Musculoskeletal: Negative.    Skin: Negative for rash.   Neurological: Negative for dizziness, tingling, tremors, sensory change, speech change, focal weakness, loss of consciousness and headaches.   Psychiatric/Behavioral: Negative.        Physical Exam   Constitutional: He is oriented to person, place, and time. He appears well-developed and well-nourished.   HENT:   Head: Normocephalic and atraumatic.   Eyes: Pupils are equal, round, and reactive to light.   Neck: Normal range of motion. Neck supple. No JVD present. No tracheal deviation present.   Cardiovascular: Normal rate, regular rhythm and intact distal pulses.  Exam reveals no gallop and no friction rub.    No murmur heard.  Pulmonary/Chest: Effort normal and breath sounds normal. No stridor. No respiratory distress. He has no wheezes.  He has no rales. He exhibits no tenderness.   Left chest wall PPM site clear.    Abdominal: Soft. Bowel sounds are normal. He exhibits no distension. There is no tenderness.   Musculoskeletal: Normal range of motion. He exhibits no edema.   Neurological: He is alert and oriented to person, place, and time.   Slur to speech.  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    Skin: Skin is warm and dry. No erythema.   Psychiatric: He has a normal mood and affect. His behavior is normal. Judgment and thought content normal.       Hemodynamics:  Temp (24hrs), Av.4 °C (97.6 °F), Min:36 °C (96.8 °F), Max:36.8 °C (98.3 °F)  Temperature: 36.3 °C (97.4 °F)  Pulse  Av.8  Min: 50  Max: 108   Blood Pressure : 130/76     Respiratory:    Respiration: 16, Pulse Oximetry: 91 %     Work Of Breathing / Effort: Mild  RUL Breath Sounds: Clear, RML Breath Sounds: Diminished, RLL Breath Sounds: Diminished, NACHO Breath Sounds: Clear, LLL Breath Sounds: Diminished  Fluids:     Weight: 79.2 kg (174 lb 9.7 oz)  GI/Nutrition:  Orders Placed This Encounter   Procedures   • Diet Order Cardiac     Standing Status:   Standing     Number of Occurrences:   1     Order Specific Question:   Diet:     Answer:   Cardiac [6]     Lab Results:  Recent Labs      18   0108   WBC  6.4   RBC  4.44*   HEMOGLOBIN  13.8*   HEMATOCRIT  43.3   MCV  97.5   MCH  31.1   MCHC  31.9*   RDW  50.3*   PLATELETCT  159*   MPV  10.4     Recent Labs      18   0108   SODIUM  142   POTASSIUM  3.4*   CHLORIDE  106   CO2  30   GLUCOSE  94   BUN  17   CREATININE  0.82   CALCIUM  8.6     Recent Labs      18   0108   INR  1.25*         Recent Labs      18   1842  18   0108   TROPONINI  0.04  0.04             Medical Decision Making, by Problem:  Active Hospital Problems    Diagnosis   • Symptomatic bradycardia [R00.1]   • Status post placement of cardiac pacemaker [Z95.0]   • Atrial fibrillation (HCC) [I48.91]       Plan:  - S/P successful [employer]  single chamber PPM.  - Device is working normally on interrogation this AM.   - CXR without evidence of late PTX.  Lead position does appear stable.   - Wound site is clear.  Pressure dressing removed and re dressed.   - Resume Eliquis tomorrow AM (8/22/18).   - Pacer restrictions reviewed with patient ad family at bedside. Do not raise affected arm above head or behind back for six weeks. May remove arm sling after 24 hrs, please wear if trouble remembering arm limitation and prn at night. No heavy lifting/pushing for six weeks. No driving for first week. No showers first week. Keep dressing clean and dry until sees us in follow up. Wound care reviewed. Instructed to report s/s of infection such as warmth/redness/drainage/swelling at site or fever/chills.  Patient and family verbalize understanding.  - EP will sign off.  Patient and family understand that he will need to be seen by his cardiologist in [facility] in 7-10 days for device/wound check.  Please call with any questions.       Quality-Core Measures   Reviewed items::  EKG reviewed, Radiology images reviewed, Labs reviewed and Medications reviewed

## 2018-08-22 NOTE — DISCHARGE INSTRUCTIONS
Discharge Instructions    Discharged to home by car with relative. Discharged via wheelchair, hospital escort: Yes.  Special equipment needed: Walker    Be sure to schedule a follow-up appointment with your primary care doctor or any specialists as instructed.     Discharge Plan:   Diet Plan: Discussed  Activity Level: Discussed  Confirmed Follow up Appointment: Patient to Call and Schedule Appointment  Confirmed Symptoms Management: Discussed  Medication Reconciliation Updated: Yes  Influenza Vaccine Indication: Not indicated: Previously immunized this influenza season and > 8 years of age    I understand that a diet low in cholesterol, fat, and sodium is recommended for good health. Unless I have been given specific instructions below for another diet, I accept this instruction as my diet prescription.   Other diet: Cardiac - Heart Healthy    Special Instructions:   HF Patient Discharge Instructions  · Monitor your weight daily, and maintain a weight chart, to track your weight changes.   · Activity as tolerated, unless your Doctor has ordered otherwise.   · Follow a low fat, low cholesterol, low salt diet unless instructed otherwise by your Doctor. Read the labels on the back of food products and track your intake of fat, cholesterol and salt.   · Fluid Restriction No. If a Fluid Restriction has been ordered by your Doctor, measure fluids with a measuring cup to ensure that you are not exceeding the restriction.   · No smoking.  · Oxygen No. If your Doctor has ordered that you wear Oxygen at home, it is important to wear it as ordered.  · Did you receive an explanation from staff on the importance of taking each of your medications and why it is necessary to keep taking them unless your doctor says to stop? Yes  · Were all of your questions answered about how to manage your heart failure and what to do if you have increased signs and symptoms after you go home? Yes  · Do you feel like your heart failure care team  involved you in the care treatment plan and allowed you to make decisions regarding your care while in the hospital and addressed any discharge needs you might have? Yes    See the educational handout provided at discharge for more information on monitoring your daily weight, activity and diet. This also explains more about Heart Failure, symptoms of a flare-up and some of the tests that you have undergone.     Warning Signs of a Flare-Up include:  · Swelling in the ankles or lower legs.  · Shortness of breath, while at rest, or while doing normal activities.   · Shortness of breath at night when in bed, or coughing in bed.   · Requiring more pillows to sleep at night, or needing to sit up at night to sleep.  · Feeling weak, dizzy or fatigued.     When to call your Doctor:  · Call [facility] seven days a week from 8:00 a.m. to 8:00 p.m. for medical questions [phone].  · Call your Primary Care Physician or Cardiologist if:   1. You experience any pain radiating to your jaw or neck.  2. You have any difficulty breathing.  3. You experience weight gain of 3 lbs in a day or 5 lbs in a week.   4. You feel any palpitations or irregular heartbeats.  5. You become dizzy or lose consciousness.   If you have had an angiogram or had a pacemaker or AICD placed, and experience:  1. Bleeding, drainage or swelling at the surgical / puncture site.  2. Fever greater than 100.0 F  3. Shock from internal defibrillator.  4. Cool and / or numb extremities.      · Is patient discharged on Warfarin / Coumadin? No      Pacemaker Battery Change  A pacemaker battery usually lasts 4 to 12 years. Once or twice per year, you will be asked to visit your health care provider to have a full evaluation of your pacemaker. When a battery needs to be replaced, the entire pacemaker is replaced so that you can benefit from new circuitry and any new features that have been added to pacemakers. Most often, this procedure is very simple  because the leads are already in place.   There are many things that affect how long a pacemaker battery will last, including:   · The age of the pacemaker.    · The number of leads (1, 2, or 3).    · The pacemaker work load. If the pacemaker is helping the heart more often, the battery will not last as long as it would if the pacemaker did not need to help the heart.    · Power (voltage) settings.   LET YOUR HEALTH CARE PROVIDER KNOW ABOUT:   · Any allergies you have.    · All medicines you are taking, including vitamins, herbs, eye drops, creams, and over-the-counter medicines.    · Previous problems you or members of your family have had with the use of anesthetics.    · Any blood disorders you have.    · Previous surgeries you have had, especially since your last pacemaker placement.    · Medical conditions you have.    · Possibility of pregnancy, if this applies.  · Symptoms of chest pain, trouble breathing, palpitations, light-headedness, or feelings of an abnormal or irregular heartbeat.  RISKS AND COMPLICATIONS   Generally, this is a safe procedure. However, as with any procedure, problems can occur and include:   · Bleeding.    · Bruising of the skin around where the incision was made.    · Pain at the incision site.    · Pulling apart of the skin at the incision site.    · Infection.    · Allergic reaction to anesthetics or other medicines used during the procedure.    People with diabetes may have a temporary increase in their blood sugar after any surgical procedure.   BEFORE THE PROCEDURE   · Wash all of the skin around the area of the chest where the pacemaker is located.    · Ask your health care provider for help with any medicine adjustments before the pacemaker is replaced.    · Do not eat or drink anything after midnight on the night before the procedure or as directed by your health care provider.  · Ask your health care provider if you can take a sip of water with any approved medicines the  morning of the procedure.  PROCEDURE   · After giving medicine to numb the skin (local anesthetic), your health care provider will make a cut to reopen the pocket holding the pacemaker.    · The old pacemaker will be disconnected from its leads.    · The leads will be tested.    · If needed, the leads will be replaced. If the leads are functioning properly, the new pacemaker may be connected to the existing leads.  · A heart monitor and the pacemaker  will be used to make sure that the new pacemaker is working properly.  · The incision site will then be closed. A dressing will be placed over the pacemaker site. The dressing will be removed 24-48 hours afterward.  AFTER THE PROCEDURE   · You will be taken to a recovery area after the new pacemaker implant is completed. Your vital signs such as blood pressure, heart rate, breathing, and oxygen levels will be monitored.  · Your health care provider will tell you when you will need to next test your pacemaker or when to return to the office for follow-up for removal of stitches.  This information is not intended to replace advice given to you by your health care provider. Make sure you discuss any questions you have with your health care provider.  Document Released: 03/27/2008 Document Revised: 01/08/2016 Document Reviewed: 07/02/2014  [employer] Interactive Patient Education © 2017 [employer]          Pacemaker Implantation, Adult  Pacemaker implantation is a procedure to place a pacemaker inside your chest. A pacemaker is a small computer that sends electrical signals to the heart and helps your heart beat normally. A pacemaker also stores information about your heart rhythms. You may need pacemaker implantation if you:  · Have a slow heartbeat (bradycardia).  · Faint (syncope).  · Have shortness of breath (dyspnea) due to heart problems.  The pacemaker attaches to your heart through a wire, called a lead. Sometimes just one lead is needed. Other times,  there will be two leads. There are two types of pacemakers:  · Transvenous pacemaker. This type is placed under the skin or muscle of your chest. The lead goes through a vein in the chest area to reach the inside of the heart.  · Epicardial pacemaker. This type is placed under the skin or muscle of your chest or belly. The lead goes through your chest to the outside of the heart.  Tell a health care provider about:  · Any allergies you have.  · All medicines you are taking, including vitamins, herbs, eye drops, creams, and over-the-counter medicines.  · Any problems you or family members have had with anesthetic medicines.  · Any blood or bone disorders you have.  · Any surgeries you have had.  · Any medical conditions you have.  · Whether you are pregnant or may be pregnant.  What are the risks?  Generally, this is a safe procedure. However, problems may occur, including:  · Infection.  · Bleeding.  · Failure of the pacemaker or the lead.  · Collapse of a lung or bleeding into a lung.  · Blood clot inside a blood vessel with a lead.  · Damage to the heart.  · Infection inside the heart (endocarditis).  · Allergic reactions to medicines.  What happens before the procedure?  Staying hydrated   Follow instructions from your health care provider about hydration, which may include:  · Up to 2 hours before the procedure - you may continue to drink clear liquids, such as water, clear fruit juice, black coffee, and plain tea.  Eating and drinking restrictions   Follow instructions from your health care provider about eating and drinking, which may include:  · 8 hours before the procedure - stop eating heavy meals or foods such as meat, fried foods, or fatty foods.  · 6 hours before the procedure - stop eating light meals or foods, such as toast or cereal.  · 6 hours before the procedure - stop drinking milk or drinks that contain milk.  · 2 hours before the procedure - stop drinking clear liquids.  Medicines  · Ask your  health care provider about:  ¨ Changing or stopping your regular medicines. This is especially important if you are taking diabetes medicines or blood thinners.  ¨ Taking medicines such as aspirin and ibuprofen. These medicines can thin your blood. Do not take these medicines before your procedure if your health care provider instructs you not to.  · You may be given antibiotic medicine to help prevent infection.  General instructions  · You will have a heart evaluation. This may include an electrocardiogram (ECG), chest X-ray, and heart imaging (echocardiogram,  or echo) tests.  · You will have blood tests.  · Do not use any products that contain nicotine or tobacco, such as cigarettes and e-cigarettes. If you need help quitting, ask your health care provider.  · Plan to have someone take you home from the hospital or clinic.  · If you will be going home right after the procedure, plan to have someone with you for 24 hours.  · Ask your health care provider how your surgical site will be marked or identified.  What happens during the procedure?  · To reduce your risk of infection:  ¨ Your health care team will wash or sanitize their hands.  ¨ Your skin will be washed with soap.  ¨ Hair may be removed from the surgical area.  · An IV tube will be inserted into one of your veins.  · You will be given one or more of the following:  ¨ A medicine to help you relax (sedative).  ¨ A medicine to numb the area (local anesthetic).  ¨ A medicine to make you fall asleep (general anesthetic).  · If you are getting a transvenous pacemaker:  ¨ An incision will be made in your upper chest.  ¨ A pocket will be made for the pacemaker. It may be placed under the skin or between layers of muscle.  ¨ The lead will be inserted into a blood vessel that returns to the heart.  ¨ While X-rays are taken by an imaging machine (fluoroscopy), the lead will be advanced through the vein to the inside of your heart.  ¨ The other end of the lead  will be tunneled under the skin and attached to the pacemaker.  · If you are getting an epicardial pacemaker:  ¨ An incision will be made near your ribs or breastbone (sternum) for the lead.  ¨ The lead will be attached to the outside of your heart.  ¨ Another incision will be made in your chest or upper belly to create a pocket for the pacemaker.  ¨ The free end of the lead will be tunneled under the skin and attached to the pacemaker.  · The transvenous or epicardial pacemaker will be tested. Imaging studies may be done to check the lead position.  · The incisions will be closed with stitches (sutures), adhesive strips, or skin glue.  · Bandages (dressing) will be placed over the incisions.  The procedure may vary among health care providers and hospitals.  What happens after the procedure?  · Your blood pressure, heart rate, breathing rate, and blood oxygen level will be monitored until the medicines you were given have worn off.  · You will be given antibiotics and pain medicine.  · ECG and chest x-rays will be done.  · You will wear a continuous type of ECG (Holter monitor) to check your heart rhythm.  · Your health care provider will program the pacemaker.  · Do not drive for 24 hours if you received a sedative.  This information is not intended to replace advice given to you by your health care provider. Make sure you discuss any questions you have with your health care provider.  Document Released: 12/08/2003 Document Revised: 07/07/2017 Document Reviewed: 05/31/2017  [employer] Interactive Patient Education © 2017 [employer]              Pacemaker Implantation, Care After  Refer to this sheet over the next few weeks. These instructions provide you with information on caring for yourself after the procedure. Your health care provider may also give you more specific instructions. Your treatment has been planned according to current medical practices, but problems sometimes occur. Call your health care  provider if you have any problems or questions regarding your pacemaker.   WHAT TO EXPECT AFTER THE PROCEDURE  · You may feel pain. Some pain is normal. It may last a few days.  · A slight bump may be seen over the skin where the device was placed. Sometimes, it is possible to feel the device under the skin. This is normal.  · In the months and years afterward, your health care provider will check the device, the leads, and the battery every few months. Eventually, when the battery is low, the device will be replaced.  HOME CARE INSTRUCTIONS  Medicines  · Take medicines only as directed by your health care provider.  · If you were prescribed an antibiotic medicine, finish it all even if you start to feel better.  · Do not take any other medicines without asking your health care provider first. Some medicines, including certain painkillers, can cause bleeding in your stomach after surgery.  Wound Care  · Do not remove the bandage on your chest until directed to do so by your health care provider.  · After your bandage is removed, you may see pieces of tape called skin adhesive strips over the area where the cut was made (incision site). Let them fall off on their own.  · Check the incision site every day to make sure it is not infected, bleeding, or starting to pull apart.  · Do not use lotions or ointments near the incision site unless directed to do so.  · Keep the incision area clean and dry for 2-3 days after the procedure or as directed by your health care provider. It takes several weeks for the incision site to completely heal.  · Do not take baths, swim, or use a hot tub until your health care provider approves.  Activities  · Try to walk a little every day. Exercising is important after this procedure. It is also important to use your shoulder on the side of the pacemaker in daily tasks that do not require exaggerated motion.  · Avoid sudden jerking, pulling, or chopping movements that pull your upper arm  far away from your body for at least 6 weeks.  · Do not lift your upper arm above your shoulders for at least 6 weeks. This means no tennis, golf, or swimming for this period of time. If you sleep with the arm above your head, use a restraint to prevent this from happening as you sleep.  · You may go back to work when your health care provider says it is okay. Check with your health care provider before you start to drive or play sports.  Other Instructions  · Follow diet instructions if they were provided. You should be able to eat what you usually do right away, but you may need to limit your salt intake.  · Weigh yourself every day. If you suddenly gain weight, fluid may be building up in your body.  · Always carry your pacemaker identification card with you. The card should list the implant date, device model, and . Consider wearing a medical alert bracelet or necklace.  · Tell all health care providers that you have a pacemaker. This may prevent them from giving you a magnetic resource imaging scan (MRI) because of the strong magnets used during that test.  · If you must pass through a metal detector, quickly walk through it. Do not stop under the detector or stand near it.  · Avoid places or objects with a strong electric or magnetic field, including:  ¨ Airport security liao. When at the airport, let officials know you have a pacemaker. Your ID card will let you be checked in a way that is safe for you and that will not damage your pacemaker. Also, do not let a security person wave a magnetic wand near your pacemaker. That can make it stop working.  ¨ Power plants.  ¨ Large electrical generators.  ¨ Radiofrequency transmission towers, such as cell phone and radio towers.  · Do not use amateur (ham) radio equipment or electric (arc) welding torches. Some devices are safe to use if held at least 1 foot from your pacemaker. These include power tools, lawn mowers, and speakers. If you are unsure of  whether something is safe to use, ask your health care provider.  · You may safely use electric blankets, heating pads, computers, and microwave ovens.  · When using your cell phone, hold it to the ear opposite the pacemaker. Do not leave your cell phone in a pocket over the pacemaker.  · Keep all follow-up visits as directed by your health care provider. This is how your health care provider makes sure your chest is healing the way it should. Ask your health care provider when you should come back to have your stitches or staples taken out.  · Have your pacemaker checked every 3-6 months or as directed by your health care provider. Most pacemakers last for 4-8 years before a new one is needed.  SEEK MEDICAL CARE IF:  · You gain weight suddenly.  · Your legs or feet swell more than they have before.  · It feels like your heart is fluttering or skipping beats (heart palpitations).  · You have a fever.  SEEK IMMEDIATE MEDICAL CARE IF:  · You have chest pain.  · You feel more short of breath than you have felt before.  · You feel more light-headed than you have felt before.  · You have problems with your incision site, such as swelling or bleeding, or it starts to open up.  · You have drainage, redness, swelling, or pain at your incision site.     This information is not intended to replace advice given to you by your health care provider. Make sure you discuss any questions you have with your health care provider.     Document Released: 07/07/2006 Document Revised: 01/08/2016 Document Reviewed: 04/19/2013  [employer] Interactive Patient Education ©2016 [employer]                  Depression / Suicide Risk    As you are discharged from this [facility] facility, it is important to learn how to keep safe from harming yourself.    Recognize the warning signs:  · Abrupt changes in personality, positive or negative- including increase in energy   · Giving away possessions  · Change in eating patterns- significant weight  changes-  positive or negative  · Change in sleeping patterns- unable to sleep or sleeping all the time   · Unwillingness or inability to communicate  · Depression  · Unusual sadness, discouragement and loneliness  · Talk of wanting to die  · Neglect of personal appearance   · Rebelliousness- reckless behavior  · Withdrawal from people/activities they love  · Confusion- inability to concentrate     If you or a loved one observes any of these behaviors or has concerns about self-harm, here's what you can do:  · Talk about it- your feelings and reasons for harming yourself  · Remove any means that you might use to hurt yourself (examples: pills, rope, extension cords, firearm)  · Get professional help from the community (Mental Health, Substance Abuse, psychological counseling)  · Do not be alone:Call your Safe Contact- someone whom you trust who will be there for you.  · Call your local CRISIS HOTLINE 103-6899 or 571-758-1857  · Call your local Children's Mobile Crisis Response Team [place] (004) 592-5930 or www.[facility]  · Call the toll free National Suicide Prevention Hotlines   · National Suicide Prevention Lifeline 569-415-IANB (7393)  · National Hope Line Network 800-SUICIDE (120-9569)

## 2018-08-22 NOTE — PROGRESS NOTES
Patient fell asleep at 2100 hours and shortly started to desat into mid 80s. Patient fluctuated back and forth 80s to 70s and back to 90s without distress. Noted was the irregular breathing pattern when asleep. 2Liters applied and Pt was back in 90s. Throughout the night Pt desaturated down from 90s to even low 70s. When awakened Pt would be back in 90s in seconds. This was constant pattern during the night

## 2018-08-30 ENCOUNTER — HOSPITAL ENCOUNTER (OUTPATIENT)
Dept: RADIOLOGY | Facility: MEDICAL CENTER | Age: 83
End: 2018-08-30

## 2018-08-30 NOTE — ADDENDUM NOTE
Encounter addended by: Rashida Chi M.D. on: 8/29/2018  9:22 PM<BR>    Actions taken: Sign clinical note